# Patient Record
(demographics unavailable — no encounter records)

---

## 2025-06-03 NOTE — REASON FOR VISIT
[Other: ____] : [unfilled] [FreeTextEntry1] : Efren is a 37-year-old male with history of obesity, anxiety, panic attack, atypical chest pain, GERD.  No history of CAD, MI, revascularization, VHD, CHF, TIA, CVA, diabetes, PVD, DVT, PE, arrhythmia, AF.  Patient has recurrent chest pain tightness pressure moderate intensity at random times rest and exertion.  Patient has dyspnea with moderate exertion.  Cardiovascular review of symptoms is negative for palpitations, dizziness or syncope.  No PND or orthopnea leg edema.  No bleeding or black stool.  No exercise routine.  Patient is walking 10 minutes on occasion.  Patient works as a .  Currently living locally with his sister after his brother-in-law  suddenly of MI.  Patient will be returning to [facility] in 6 months where he normally lives.  EKG 2025 sinus rhythm normal tracing

## 2025-06-03 NOTE — DISCUSSION/SUMMARY
[FreeTextEntry1] : Patient has medical history detailed above and active medical issues including:  - Recurrent chest pain, multiple CAD risk factors.  Coronary CTA and coronary calcium score ordered to assess for obstructive CAD and risk stratification.  Echocardiogram ordered to evaluate for structural heart disease, carotid and abdominal ultrasound to evaluate for PAD with cardiology follow-up.  - Obesity, gained 80 pounds over the past year.  Discussed intermittent fasting, calorie reduction and increased exercise as a weight reduction strategy.   - History of anxiety panic attacks  - Hyperlipidemia, unclear lipid status, labs ordered  Cardiology follow-up after noninvasive testing  Advised patient to follow active lifestyle with regular cardiovascular exercise. Patient educated on heart healthy diet. Recommend increased oral hydration with electrolyte supplement drinks, avoid excess alcohol and caffeine.  Patient is aware to call with any symptoms or concerns.   Carl will follow-up with PCP for primary care.  Total time spent 45 minutes, reviewing of test results, chart information, patient discussion, physical exam and completion of chart documentation.

## 2025-06-03 NOTE — REASON FOR VISIT
[Other: ____] : [unfilled] [FreeTextEntry1] : Efren is a 37-year-old male with history of obesity, anxiety, panic attack, atypical chest pain, GERD.  No history of CAD, MI, revascularization, VHD, CHF, TIA, CVA, diabetes, PVD, DVT, PE, arrhythmia, AF.  Patient has recurrent chest pain tightness pressure moderate intensity at random times rest and exertion.  Patient has dyspnea with moderate exertion.  Cardiovascular review of symptoms is negative for palpitations, dizziness or syncope.  No PND or orthopnea leg edema.  No bleeding or black stool.  No exercise routine.  Patient is walking 10 minutes on occasion.  Patient works as a .  Currently living locally with his sister after his brother-in-law  suddenly of MI.  Patient will be returning to [facility] in 6 months where he normally lives.  EKG 2025 sinus rhythm normal tracing